# Patient Record
Sex: FEMALE | Race: OTHER | URBAN - METROPOLITAN AREA
[De-identification: names, ages, dates, MRNs, and addresses within clinical notes are randomized per-mention and may not be internally consistent; named-entity substitution may affect disease eponyms.]

---

## 2021-08-28 ENCOUNTER — TELEPHONE ENCOUNTER (OUTPATIENT)
Dept: URBAN - METROPOLITAN AREA CLINIC 13 | Facility: CLINIC | Age: 76
End: 2021-08-28

## 2021-08-29 ENCOUNTER — TELEPHONE ENCOUNTER (OUTPATIENT)
Dept: URBAN - METROPOLITAN AREA CLINIC 13 | Facility: CLINIC | Age: 76
End: 2021-08-29

## 2023-03-23 ENCOUNTER — LAB OUTSIDE AN ENCOUNTER (OUTPATIENT)
Dept: URBAN - METROPOLITAN AREA CLINIC 27 | Facility: CLINIC | Age: 78
End: 2023-03-23

## 2023-03-23 ENCOUNTER — OFFICE VISIT (OUTPATIENT)
Dept: URBAN - METROPOLITAN AREA CLINIC 27 | Facility: CLINIC | Age: 78
End: 2023-03-23
Payer: COMMERCIAL

## 2023-03-23 ENCOUNTER — TELEPHONE ENCOUNTER (OUTPATIENT)
Dept: URBAN - METROPOLITAN AREA CLINIC 27 | Facility: CLINIC | Age: 78
End: 2023-03-23

## 2023-03-23 ENCOUNTER — DASHBOARD ENCOUNTERS (OUTPATIENT)
Age: 78
End: 2023-03-23

## 2023-03-23 ENCOUNTER — WEB ENCOUNTER (OUTPATIENT)
Dept: URBAN - METROPOLITAN AREA CLINIC 27 | Facility: CLINIC | Age: 78
End: 2023-03-23

## 2023-03-23 VITALS
SYSTOLIC BLOOD PRESSURE: 148 MMHG | WEIGHT: 216 LBS | DIASTOLIC BLOOD PRESSURE: 93 MMHG | HEART RATE: 79 BPM | HEIGHT: 61 IN | BODY MASS INDEX: 40.78 KG/M2

## 2023-03-23 DIAGNOSIS — I10 HYPERTENSION: ICD-10-CM

## 2023-03-23 DIAGNOSIS — R05.3 CHRONIC COUGH: ICD-10-CM

## 2023-03-23 DIAGNOSIS — Z86.010 HX OF ADENOMATOUS POLYP OF COLON: ICD-10-CM

## 2023-03-23 DIAGNOSIS — R93.3 ABNORMAL FINDING ON GI TRACT IMAGING: ICD-10-CM

## 2023-03-23 DIAGNOSIS — E66.01 MORBID OBESITY: ICD-10-CM

## 2023-03-23 PROCEDURE — 99204 OFFICE O/P NEW MOD 45 MIN: CPT | Performed by: INTERNAL MEDICINE

## 2023-03-23 PROCEDURE — 99244 OFF/OP CNSLTJ NEW/EST MOD 40: CPT | Performed by: INTERNAL MEDICINE

## 2023-03-23 NOTE — PHYSICAL EXAM GASTROINTESTINAL
Abdomen is soft, nontender, nondistended, no guarding or rigidity, no masses palpable, normal bowel sounds; morbid obesity

## 2023-03-23 NOTE — HPI-TODAY'S VISIT:
Patient here at the request of Dr. Calhoun for evaluation of a recent abnormal barium swallow.  She underwent a barium swallow late last year because of cough and ?dysphagia (which she apparently no longer has), and tertiary contractions over the majority of the esophagus, predominantly in the mid and distal third of the esophagus were seen.  No esophageal stricture was present.  She was eventually able to completely clear the esophagus in the upright position but not in the prone position.  The mucosa appeared normal.  There was a small hiatal hernia; no reflux was noted.  She was sent here for GI evaluation.  She currently has no GI symptoms and specifically denies reflux or dysphagia.  She has not lost any weight.  She has had a chronic nonproductive cough for at least the past 6 to 12 months.  She has been evaluated by Dr. Calhoun for this, and no clear etiology for her cough has yet been found.  She has not had a prior upper endoscopy, but did undergo colonoscopy in February of last year.  Three adenomatous polyps were removed.  Pancolonic diverticulosis was seen.  There is no family history of colon cancer or polyps.  She does take a daily aspirin.  Recent labs show a creatinine of 1.44.  Her liver enzymes were normal.  Her sed rate was 82.  She does drink 2 alcoholic beverages per day.

## 2023-05-03 ENCOUNTER — OFFICE VISIT (OUTPATIENT)
Dept: URBAN - METROPOLITAN AREA MEDICAL CENTER 8 | Facility: MEDICAL CENTER | Age: 78
End: 2023-05-03

## 2023-05-09 ENCOUNTER — OFFICE VISIT (OUTPATIENT)
Dept: URBAN - METROPOLITAN AREA MEDICAL CENTER 8 | Facility: MEDICAL CENTER | Age: 78
End: 2023-05-09
Payer: COMMERCIAL

## 2023-05-09 DIAGNOSIS — K22.89 OTHER SPECIFIED DISEASE OF ESOPHAGUS: ICD-10-CM

## 2023-05-09 DIAGNOSIS — K21.9 ACID REFLUX: ICD-10-CM

## 2023-05-09 DIAGNOSIS — K44.9 DIAPHRAGMATIC HERNIA: ICD-10-CM

## 2023-05-09 PROCEDURE — 91010 ESOPHAGUS MOTILITY STUDY: CPT | Performed by: INTERNAL MEDICINE

## 2023-06-07 ENCOUNTER — TELEPHONE ENCOUNTER (OUTPATIENT)
Dept: URBAN - METROPOLITAN AREA CLINIC 27 | Facility: CLINIC | Age: 78
End: 2023-06-07

## 2023-06-07 ENCOUNTER — CLAIMS CREATED FROM THE CLAIM WINDOW (OUTPATIENT)
Dept: URBAN - METROPOLITAN AREA MEDICAL CENTER 8 | Facility: MEDICAL CENTER | Age: 78
End: 2023-06-07

## 2023-06-07 ENCOUNTER — CLAIMS CREATED FROM THE CLAIM WINDOW (OUTPATIENT)
Dept: URBAN - METROPOLITAN AREA MEDICAL CENTER 8 | Facility: MEDICAL CENTER | Age: 78
End: 2023-06-07
Payer: COMMERCIAL

## 2023-06-07 DIAGNOSIS — K22.2 BENIGN ESOPHAGEAL STRICTURE: ICD-10-CM

## 2023-06-07 DIAGNOSIS — K31.89 OTHER DISEASES OF STOMACH AND DUODENUM: ICD-10-CM

## 2023-06-07 PROCEDURE — 43235 EGD DIAGNOSTIC BRUSH WASH: CPT | Performed by: INTERNAL MEDICINE

## 2023-06-22 ENCOUNTER — OUT OF OFFICE VISIT (OUTPATIENT)
Dept: URBAN - METROPOLITAN AREA MEDICAL CENTER 8 | Facility: MEDICAL CENTER | Age: 78
End: 2023-06-22
Payer: COMMERCIAL

## 2023-06-22 DIAGNOSIS — R12 BURNING REFLUX: ICD-10-CM

## 2023-06-22 DIAGNOSIS — R07.89 ACUTE CHEST WALL PAIN: ICD-10-CM

## 2023-06-22 PROCEDURE — 91035 G-ESOPH REFLX TST W/ELECTROD: CPT | Performed by: INTERNAL MEDICINE
